# Patient Record
Sex: FEMALE | Race: WHITE | NOT HISPANIC OR LATINO | URBAN - METROPOLITAN AREA
[De-identification: names, ages, dates, MRNs, and addresses within clinical notes are randomized per-mention and may not be internally consistent; named-entity substitution may affect disease eponyms.]

---

## 2017-06-03 ENCOUNTER — EMERGENCY (EMERGENCY)
Facility: HOSPITAL | Age: 22
LOS: 1 days | Discharge: HOME | End: 2017-06-03
Admitting: EMERGENCY MEDICINE
Payer: COMMERCIAL

## 2017-06-03 VITALS
TEMPERATURE: 98 F | SYSTOLIC BLOOD PRESSURE: 129 MMHG | OXYGEN SATURATION: 100 % | RESPIRATION RATE: 16 BRPM | DIASTOLIC BLOOD PRESSURE: 85 MMHG | HEART RATE: 86 BPM

## 2017-06-03 PROCEDURE — 71020: CPT | Mod: 26

## 2017-06-03 PROCEDURE — 93010 ELECTROCARDIOGRAM REPORT: CPT | Mod: 59

## 2017-06-03 PROCEDURE — 99284 EMERGENCY DEPT VISIT MOD MDM: CPT | Mod: 25

## 2017-06-03 RX ORDER — KETOROLAC TROMETHAMINE 30 MG/ML
15 SYRINGE (ML) INJECTION ONCE
Qty: 0 | Refills: 0 | Status: DISCONTINUED | OUTPATIENT
Start: 2017-06-03 | End: 2017-06-03

## 2017-06-03 RX ORDER — LIDOCAINE 4 G/100G
1 CREAM TOPICAL ONCE
Qty: 0 | Refills: 0 | Status: COMPLETED | OUTPATIENT
Start: 2017-06-03 | End: 2017-06-03

## 2017-06-03 RX ADMIN — Medication 15 MILLIGRAM(S): at 11:57

## 2017-06-03 RX ADMIN — LIDOCAINE 1 PATCH: 4 CREAM TOPICAL at 13:04

## 2017-06-03 RX ADMIN — Medication 15 MILLIGRAM(S): at 12:38

## 2017-06-03 NOTE — ED PROVIDER NOTE - MEDICAL DECISION MAKING DETAILS
21 yo F no pmhx here for right lower back pain since yesterday. 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 +paraspinal tenderness on right lumbar area, no neuro sxs, no evidence of cord compression, no direct trauma or injury. EKG performed in triage NSR HR 83 inverted T in V1/V2 which self corrects. Denies any CP/SOB/difficulty breathing, no fhx of sudden death or cardiac disease. MSK injury. PLAN: NSAIDS, heat, stretching, supportive care.

## 2017-06-03 NOTE — ED PROVIDER NOTE - CHPI ED SYMPTOMS NEG
no bladder dysfunction/no bowel dysfunction/no motor function loss/no anorexia/no numbness/no tingling/no constipation/no neck tenderness/no fatigue/no difficulty bearing weight

## 2017-06-03 NOTE — ED PROVIDER NOTE - CARE PLAN
Principal Discharge DX:	Back pain  Instructions for follow-up, activity and diet:	You can use heat packs to the area, you can stretch lightly, avoid heavy lifting. You can use over the counter lidoderm/lidocaine patches as directed. Rest, drink plenty of fluids.  Advance activity as tolerated.  Continue all previously prescribed medications as directed. You can use motrin 600mg every 6-8 hours for pain or fever, and/or Tylenol 650 mg every 4 hours for pain/fever. Follow up with your primary care physician in 48-72 hours- bring copies of your results.  Return to the emergency department for chest pain, shortness of breath, dizziness, or worsening, concerning, or persistent symptoms.

## 2017-06-03 NOTE — ED PROVIDER NOTE - PROGRESS NOTE DETAILS
JOSE More; pain improved with toradol and lidoderm, pt doing well, requesting dc home, vebral XR read negative, will await final read in computer to d/c pt home with results. pt and family agree with plan.

## 2017-06-03 NOTE — ED PROVIDER NOTE - PLAN OF CARE
You can use heat packs to the area, you can stretch lightly, avoid heavy lifting. You can use over the counter lidoderm/lidocaine patches as directed. Rest, drink plenty of fluids.  Advance activity as tolerated.  Continue all previously prescribed medications as directed. You can use motrin 600mg every 6-8 hours for pain or fever, and/or Tylenol 650 mg every 4 hours for pain/fever. Follow up with your primary care physician in 48-72 hours- bring copies of your results.  Return to the emergency department for chest pain, shortness of breath, dizziness, or worsening, concerning, or persistent symptoms.

## 2017-06-03 NOTE — ED PROVIDER NOTE - PHYSICAL EXAMINATION
BACK: no midline tenderness to C/T/L/S spine, FROM of neck, ++ paraspinal tenderness to right lumbar paravertebral muscles with spasm, FROM of back, normal gait. No evidence of swelling, ecchymosis, erythema. Denies IVDU. Sensate intact.

## 2018-07-23 NOTE — ED PROVIDER NOTE - OBJECTIVE STATEMENT
21 yo F no pmhx here for right sided back pain x 1 day. Pt states yesterday she was outside with her friend and isnt sure exactly what she did but twisted "the wrong way" and then felt back pain. Yesterday patient states she felt the pain going from the right side of her back to the ride ribs, which has now resolved.  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 Applied hot/ice pack overnight with improvement but pt is going to concert tonight and wants to feel better before concert. otherwise well. Denies meds, OCPs, surgery, travel, hx of DVT/PE, hx of cardiac disease. Denies fever, chills, CP, SOB, weakness, difficulty breathing, leg swelling, numbness, tingling, incontinence. Yes

## 2023-02-14 ENCOUNTER — LABORATORY RESULT (OUTPATIENT)
Age: 28
End: 2023-02-14

## 2023-02-14 ENCOUNTER — NON-APPOINTMENT (OUTPATIENT)
Age: 28
End: 2023-02-14

## 2023-02-14 ENCOUNTER — APPOINTMENT (OUTPATIENT)
Dept: PULMONOLOGY | Facility: CLINIC | Age: 28
End: 2023-02-14
Payer: COMMERCIAL

## 2023-02-14 VITALS
DIASTOLIC BLOOD PRESSURE: 77 MMHG | SYSTOLIC BLOOD PRESSURE: 118 MMHG | WEIGHT: 132 LBS | OXYGEN SATURATION: 98 % | HEART RATE: 76 BPM | HEIGHT: 62 IN | BODY MASS INDEX: 24.29 KG/M2

## 2023-02-14 DIAGNOSIS — R09.82 POSTNASAL DRIP: ICD-10-CM

## 2023-02-14 DIAGNOSIS — R05.9 COUGH, UNSPECIFIED: ICD-10-CM

## 2023-02-14 PROCEDURE — 99203 OFFICE O/P NEW LOW 30 MIN: CPT | Mod: 25

## 2023-02-15 PROBLEM — R05.9 COUGH: Status: ACTIVE | Noted: 2023-02-15

## 2023-02-15 NOTE — HISTORY OF PRESENT ILLNESS
[TextBox_4] : María is a pleasant 27-year-old female with history of postnasal drip/seasonal allergies, s/p sinus surgery for deviated nasal septum/nasal polp, she recently complains of clogged nostril, has no chest pain, shortness of breath or fever.  Was prescribed Astepro and prednisone by ENT Dr. Sony Ivy.  She wants to check for possible fungal infection.  She reports that mold was found in her apartment.  \par She is a non smoker.

## 2023-02-15 NOTE — DISCUSSION/SUMMARY
[FreeTextEntry1] : Venipuncture with labs drawn in office\forrest Daniel is clinically asymptomatic from pulmonary perspective, so no medication is indicated at this point.

## 2023-02-20 LAB
A ALTERNATA IGE QN: <0.1 KUA/L
A ALTERNATA IGE QN: <0.1 KUA/L
A FLAVUS AB FLD QL: NEGATIVE
A FUMIGATUS AB FLD QL: NEGATIVE
A FUMIGATUS IGE QN: <0.1 KUA/L
A FUMIGATUS IGE QN: <0.1 KUA/L
A NIGER AB FLD QL: NEGATIVE
ALBUMIN SERPL ELPH-MCNC: 4.4 G/DL
ALP BLD-CCNC: 72 U/L
ALT SERPL-CCNC: 13 U/L
ANION GAP SERPL CALC-SCNC: 12 MMOL/L
AST SERPL-CCNC: 15 U/L
BASOPHILS # BLD AUTO: 0.03 K/UL
BASOPHILS NFR BLD AUTO: 0.5 %
BERMUDA GRASS IGE QN: <0.1 KUA/L
BILIRUB SERPL-MCNC: 0.2 MG/DL
BOXELDER IGE QN: <0.1 KUA/L
BUN SERPL-MCNC: 7 MG/DL
C ALBICANS IGE QN: <0.1 KUA/L
C HERBARUM IGE QN: <0.1 KUA/L
C HERBARUM IGE QN: <0.1 KUA/L
CALCIUM SERPL-MCNC: 9.2 MG/DL
CALIF WALNUT IGE QN: <0.1 KUA/L
CAT DANDER IGE QN: <0.1 KUA/L
CAT DANDER IGE QN: <0.1 KUA/L
CHLORIDE SERPL-SCNC: 105 MMOL/L
CLAM IGE QN: <0.1 KUA/L
CMN PIGWEED IGE QN: <0.1 KUA/L
CO2 SERPL-SCNC: 23 MMOL/L
CODFISH IGE QN: <0.1 KUA/L
COMMON RAGWEED IGE QN: <0.1 KUA/L
COMMON RAGWEED IGE QN: <0.1 KUA/L
CORN IGE QN: <0.1 KUA/L
COTTONWOOD IGE QN: <0.1 KUA/L
COW MILK IGE QN: <0.1 KUA/L
CREAT SERPL-MCNC: 0.72 MG/DL
CRP SERPL-MCNC: <3 MG/L
D FARINAE IGE QN: <0.1 KUA/L
D FARINAE IGE QN: <0.1 KUA/L
D PTERONYSS IGE QN: <0.1 KUA/L
D PTERONYSS IGE QN: <0.1 KUA/L
DEPRECATED A ALTERNATA IGE RAST QL: 0
DEPRECATED A ALTERNATA IGE RAST QL: 0
DEPRECATED A FUMIGATUS IGE RAST QL: 0
DEPRECATED A FUMIGATUS IGE RAST QL: 0
DEPRECATED BERMUDA GRASS IGE RAST QL: 0
DEPRECATED BOXELDER IGE RAST QL: 0
DEPRECATED C ALBICANS IGE RAST QL: 0
DEPRECATED C HERBARUM IGE RAST QL: 0
DEPRECATED C HERBARUM IGE RAST QL: 0
DEPRECATED CAT DANDER IGE RAST QL: 0
DEPRECATED CAT DANDER IGE RAST QL: 0
DEPRECATED CLAM IGE RAST QL: 0
DEPRECATED CODFISH IGE RAST QL: 0
DEPRECATED COMMON PIGWEED IGE RAST QL: 0
DEPRECATED COMMON RAGWEED IGE RAST QL: 0
DEPRECATED COMMON RAGWEED IGE RAST QL: 0
DEPRECATED CORN IGE RAST QL: 0
DEPRECATED COTTONWOOD IGE RAST QL: 0
DEPRECATED COW MILK IGE RAST QL: 0
DEPRECATED D FARINAE IGE RAST QL: 0
DEPRECATED D FARINAE IGE RAST QL: 0
DEPRECATED D PTERONYSS IGE RAST QL: 0
DEPRECATED D PTERONYSS IGE RAST QL: 0
DEPRECATED DOG DANDER IGE RAST QL: 0
DEPRECATED DOG DANDER IGE RAST QL: 0
DEPRECATED EGG WHITE IGE RAST QL: 0
DEPRECATED GOOSEFOOT IGE RAST QL: 0
DEPRECATED LONDON PLANE IGE RAST QL: 0
DEPRECATED M RACEMOSUS IGE RAST QL: 0
DEPRECATED MOUSE URINE PROT IGE RAST QL: 0
DEPRECATED MUGWORT IGE RAST QL: 0
DEPRECATED P NOTATUM IGE RAST QL: 0
DEPRECATED PEANUT IGE RAST QL: 0
DEPRECATED RED CEDAR IGE RAST QL: 0
DEPRECATED ROACH IGE RAST QL: 0
DEPRECATED ROACH IGE RAST QL: 0
DEPRECATED SCALLOP IGE RAST QL: <0.1 KUA/L
DEPRECATED SESAME SEED IGE RAST QL: 0
DEPRECATED SHEEP SORREL IGE RAST QL: 0
DEPRECATED SHRIMP IGE RAST QL: 0
DEPRECATED SILVER BIRCH IGE RAST QL: 0
DEPRECATED SOYBEAN IGE RAST QL: 0
DEPRECATED TIMOTHY IGE RAST QL: 0
DEPRECATED TIMOTHY IGE RAST QL: 0
DEPRECATED WALNUT IGE RAST QL: 0
DEPRECATED WHEAT IGE RAST QL: 0
DEPRECATED WHITE ASH IGE RAST QL: 0
DEPRECATED WHITE OAK IGE RAST QL: 0
DEPRECATED WHITE OAK IGE RAST QL: 0
DOG DANDER IGE QN: <0.1 KUA/L
DOG DANDER IGE QN: <0.1 KUA/L
EGFR: 119 ML/MIN/1.73M2
EGG WHITE IGE QN: <0.1 KUA/L
EOSINOPHIL # BLD AUTO: 0.19 K/UL
EOSINOPHIL NFR BLD AUTO: 3 %
ERYTHROCYTE [SEDIMENTATION RATE] IN BLOOD BY WESTERGREN METHOD: 11 MM/HR
FERRITIN SERPL-MCNC: 43 NG/ML
GALACTOMANNAN AG SERPL QL IA: 0.07 INDEX
GLUCOSE SERPL-MCNC: 86 MG/DL
GOOSEFOOT IGE QN: <0.1 KUA/L
H CAPSUL AB SER QL: NORMAL
H CAPSUL AG SER IA-ACNC: NORMAL
H CAPSUL MYC AB SER QL: NORMAL
HCT VFR BLD CALC: 38.2 %
HGB BLD-MCNC: 11.9 G/DL
IMM GRANULOCYTES NFR BLD AUTO: 0.2 %
LONDON PLANE IGE QN: <0.1 KUA/L
LYMPHOCYTES # BLD AUTO: 2.12 K/UL
LYMPHOCYTES NFR BLD AUTO: 34 %
M RACEMOSUS IGE QN: <0.1 KUA/L
MAN DIFF?: NORMAL
MCHC RBC-ENTMCNC: 27.9 PG
MCHC RBC-ENTMCNC: 31.2 GM/DL
MCV RBC AUTO: 89.7 FL
MONOCYTES # BLD AUTO: 0.69 K/UL
MONOCYTES NFR BLD AUTO: 11.1 %
MOUSE URINE PROT IGE QN: <0.1 KUA/L
MUGWORT IGE QN: <0.1 KUA/L
MULBERRY (T70) CLASS: 0
MULBERRY (T70) CONC: <0.1 KUA/L
NEUTROPHILS # BLD AUTO: 3.2 K/UL
NEUTROPHILS NFR BLD AUTO: 51.2 %
P NOTATUM IGE QN: <0.1 KUA/L
PEANUT IGE QN: <0.1 KUA/L
PLATELET # BLD AUTO: 255 K/UL
POTASSIUM SERPL-SCNC: 4.1 MMOL/L
PROCALCITONIN SERPL-MCNC: 0.02 NG/ML
PROT SERPL-MCNC: 6.9 G/DL
RBC # BLD: 4.26 M/UL
RBC # FLD: 12.5 %
RED CEDAR IGE QN: <0.1 KUA/L
ROACH IGE QN: <0.1 KUA/L
ROACH IGE QN: <0.1 KUA/L
SCALLOP IGE QN: 0
SCALLOP IGE QN: <0.1 KUA/L
SESAME SEED IGE QN: <0.1 KUA/L
SHEEP SORREL IGE QN: <0.1 KUA/L
SILVER BIRCH IGE QN: <0.1 KUA/L
SODIUM SERPL-SCNC: 140 MMOL/L
SOYBEAN IGE QN: <0.1 KUA/L
TIMOTHY IGE QN: <0.1 KUA/L
TIMOTHY IGE QN: <0.1 KUA/L
TREE ALLERG MIX1 IGE QL: 0
WALNUT IGE QN: <0.1 KUA/L
WBC # FLD AUTO: 6.24 K/UL
WHEAT IGE QN: <0.1 KUA/L
WHITE ASH IGE QN: <0.1 KUA/L
WHITE ELM IGE QN: 0
WHITE ELM IGE QN: <0.1 KUA/L
WHITE OAK IGE QN: <0.1 KUA/L
WHITE OAK IGE QN: <0.1 KUA/L

## 2023-10-14 ENCOUNTER — NON-APPOINTMENT (OUTPATIENT)
Age: 28
End: 2023-10-14

## 2023-10-15 ENCOUNTER — TRANSCRIPTION ENCOUNTER (OUTPATIENT)
Age: 28
End: 2023-10-15

## 2023-10-19 ENCOUNTER — NON-APPOINTMENT (OUTPATIENT)
Age: 28
End: 2023-10-19

## 2024-01-16 ENCOUNTER — NON-APPOINTMENT (OUTPATIENT)
Age: 29
End: 2024-01-16

## 2024-06-29 ENCOUNTER — NON-APPOINTMENT (OUTPATIENT)
Age: 29
End: 2024-06-29